# Patient Record
Sex: MALE | Race: BLACK OR AFRICAN AMERICAN | NOT HISPANIC OR LATINO | Employment: PART TIME | URBAN - METROPOLITAN AREA
[De-identification: names, ages, dates, MRNs, and addresses within clinical notes are randomized per-mention and may not be internally consistent; named-entity substitution may affect disease eponyms.]

---

## 2024-07-30 ENCOUNTER — OFFICE VISIT (OUTPATIENT)
Dept: FAMILY MEDICINE | Facility: CLINIC | Age: 59
End: 2024-07-30
Payer: COMMERCIAL

## 2024-07-30 VITALS
HEIGHT: 64 IN | HEART RATE: 67 BPM | RESPIRATION RATE: 12 BRPM | SYSTOLIC BLOOD PRESSURE: 127 MMHG | DIASTOLIC BLOOD PRESSURE: 86 MMHG | WEIGHT: 211 LBS | BODY MASS INDEX: 36.02 KG/M2 | TEMPERATURE: 97.8 F | OXYGEN SATURATION: 98 %

## 2024-07-30 DIAGNOSIS — E66.812 CLASS 2 SEVERE OBESITY DUE TO EXCESS CALORIES WITH SERIOUS COMORBIDITY AND BODY MASS INDEX (BMI) OF 36.0 TO 36.9 IN ADULT (H): ICD-10-CM

## 2024-07-30 DIAGNOSIS — M25.511 CHRONIC RIGHT SHOULDER PAIN: ICD-10-CM

## 2024-07-30 DIAGNOSIS — G89.29 CHRONIC RIGHT SHOULDER PAIN: ICD-10-CM

## 2024-07-30 DIAGNOSIS — G25.0 ESSENTIAL TREMOR: Primary | ICD-10-CM

## 2024-07-30 DIAGNOSIS — E78.00 HIGH CHOLESTEROL: ICD-10-CM

## 2024-07-30 DIAGNOSIS — H53.9 VISION CHANGES: ICD-10-CM

## 2024-07-30 DIAGNOSIS — E66.01 CLASS 2 SEVERE OBESITY DUE TO EXCESS CALORIES WITH SERIOUS COMORBIDITY AND BODY MASS INDEX (BMI) OF 36.0 TO 36.9 IN ADULT (H): ICD-10-CM

## 2024-07-30 PROCEDURE — 99204 OFFICE O/P NEW MOD 45 MIN: CPT | Performed by: PHYSICIAN ASSISTANT

## 2024-07-30 PROCEDURE — G2211 COMPLEX E/M VISIT ADD ON: HCPCS | Performed by: PHYSICIAN ASSISTANT

## 2024-07-30 RX ORDER — ATORVASTATIN CALCIUM 20 MG/1
20 TABLET, FILM COATED ORAL DAILY
Qty: 90 TABLET | Refills: 3 | Status: SHIPPED | OUTPATIENT
Start: 2024-07-30

## 2024-07-30 RX ORDER — PROPRANOLOL HYDROCHLORIDE 10 MG/1
10 TABLET ORAL 2 TIMES DAILY
Qty: 180 TABLET | Refills: 3 | Status: SHIPPED | OUTPATIENT
Start: 2024-07-30

## 2024-07-30 ASSESSMENT — PAIN SCALES - GENERAL: PAINLEVEL: SEVERE PAIN (7)

## 2024-07-30 NOTE — PROGRESS NOTES
Assessment & Plan     Class 2 severe obesity due to excess calories with serious comorbidity and body mass index (BMI) of 36.0 to 36.9 in adult (H)  Given handout    Essential tremor  stable  - propranolol (INDERAL) 10 MG tablet; Take 1 tablet (10 mg) by mouth 2 times daily    Vision changes  referred  - Adult Eye  Referral; Future  - Basic metabolic panel  (Ca, Cl, CO2, Creat, Gluc, K, Na, BUN); Future    High cholesterol  Restart meds then check labs in the fall  - atorvastatin (LIPITOR) 20 MG tablet; Take 1 tablet (20 mg) by mouth daily For cholesterol  - Lipid panel reflex to direct LDL Fasting; Future    Chronic right shoulder pain  If pt not helpful refer to sports medicine  Continue tylenol prn  - Physical Therapy  Referral; Future      Patient Instructions   Lifestyle recommendations:  Being overweight or obese puts you are risk of major health problems including but not limited to: heart disease/heart attack, stroke, high cholesterol, high blood pressure, and diabetes.  This is why it is important to be at a healthy weight for your height.     Exercise 30 minutes 5 times a week, if you can only do 10 minutes 3 times a week that is still shown to have great benefit!  Brisk walking even counts for this.  Consider free youtube videos for exercise that fits your needs and lifestyle.     Monitor your caffeine and soda intake, try to minimize these beverages    Drink plenty of water (about 70-80 ounces a day)    Try to eat a vegetable and fruit  with lunch and dinner.  Have a breakfast that contains protein such as eggs or oatmeal.  Decrease your white bread, pasta, and sweets intake.  Increase lean proteins like chicken or pork. Try to eat out 1-2 times a week or less.  Monitor your portion sizes, try using smaller plates if needed.  Eat slowly, this gives you time to be aware that your body is full.     Let me know at any time if you would like a referral to a  "nutritionist!          Subjective   Holden is a 58 year old, presenting for the following health issues:  Shoulder Pain (right), Tremors, and Referral (Eye provider for Rx glasses)        7/30/2024    12:41 PM   Additional Questions   Roomed by Bella STAFFORD CMA   Accompanied by alone         7/30/2024    12:41 PM   Patient Reported Additional Medications   Patient reports taking the following new medications None     Shoulder Pain    History of Present Illness       Reason for visit:  Rotator cuff tendonitis, essential tremors and eye examination  Symptom onset:  More than a month  Symptoms include:  Pain in the arm and shoulder  Symptom intensity:  Moderate  Symptom progression:  Improving  Had these symptoms before:  No  What makes it worse:  Lifting heavy stuff, and lying on the arm  What makes it better:  Physio exercises    He eats 0-1 servings of fruits and vegetables daily.He consumes 0 sweetened beverage(s) daily.He exercises with enough effort to increase his heart rate 10 to 19 minutes per day.  He exercises with enough effort to increase his heart rate 3 or less days per week.   He is taking medications regularly.       Coming from [place]. Used to take a medication for essential tremor but ran out.     Essential tremor-was on propanolol 10 mg bid. Was on for 5 months. Helped. No side effects.     High cholesterol-last labs in october. Not due. No side effects. Has been out of atorvastatin for a month.     Shoulder pain comes and goes. Was on naproxen 500 mg bid prn. Has ran out. Told tendonitis before. Tylenol actually works well. Started physical therapy for his shoulder. Did not finish. He would like to.     Has not had colonoscopy before. Thinks he did a stool test but not sure which kind. He wants to look into this first before pursuing other testing.           Objective    /86   Pulse 67   Temp 97.8  F (36.6  C) (Temporal)   Resp 12   Ht 1.626 m (5' 4\")   Wt 95.7 kg (211 lb)   SpO2 98%   " BMI 36.22 kg/m    Body mass index is 36.22 kg/m .  Physical Exam   GENERAL: alert and no distress  RESP: lungs clear to auscultation - no rales, rhonchi or wheezes  CV: regular rate and rhythm, normal S1 S2, no S3 or S4, no murmur, click or rub, no peripheral edema  MS: no gross musculoskeletal defects noted, no edema  PSYCH: mentation appears normal, affect normal/bright            Signed Electronically by: Shirley Shabazz PA-C

## 2024-07-30 NOTE — PATIENT INSTRUCTIONS
Lifestyle recommendations:  Being overweight or obese puts you are risk of major health problems including but not limited to: heart disease/heart attack, stroke, high cholesterol, high blood pressure, and diabetes.  This is why it is important to be at a healthy weight for your height.     Exercise 30 minutes 5 times a week, if you can only do 10 minutes 3 times a week that is still shown to have great benefit!  Brisk walking even counts for this.  Consider free youtube videos for exercise that fits your needs and lifestyle.     Monitor your caffeine and soda intake, try to minimize these beverages    Drink plenty of water (about 70-80 ounces a day)    Try to eat a vegetable and fruit  with lunch and dinner.  Have a breakfast that contains protein such as eggs or oatmeal.  Decrease your white bread, pasta, and sweets intake.  Increase lean proteins like chicken or pork. Try to eat out 1-2 times a week or less.  Monitor your portion sizes, try using smaller plates if needed.  Eat slowly, this gives you time to be aware that your body is full.     Let me know at any time if you would like a referral to a nutritionist!

## 2024-07-31 ENCOUNTER — APPOINTMENT (OUTPATIENT)
Dept: OPTOMETRY | Facility: CLINIC | Age: 59
End: 2024-07-31
Payer: COMMERCIAL

## 2024-07-31 ENCOUNTER — OFFICE VISIT (OUTPATIENT)
Dept: OPHTHALMOLOGY | Facility: CLINIC | Age: 59
End: 2024-07-31
Attending: PHYSICIAN ASSISTANT
Payer: COMMERCIAL

## 2024-07-31 DIAGNOSIS — H52.4 HYPEROPIA OF BOTH EYES WITH ASTIGMATISM AND PRESBYOPIA: ICD-10-CM

## 2024-07-31 DIAGNOSIS — H52.203 HYPEROPIA OF BOTH EYES WITH ASTIGMATISM AND PRESBYOPIA: ICD-10-CM

## 2024-07-31 DIAGNOSIS — H52.03 HYPEROPIA OF BOTH EYES WITH ASTIGMATISM AND PRESBYOPIA: ICD-10-CM

## 2024-07-31 DIAGNOSIS — H25.813 COMBINED FORMS OF AGE-RELATED CATARACT OF BOTH EYES: Primary | ICD-10-CM

## 2024-07-31 PROCEDURE — V2200 LENS SPHER BIFOC PLANO 4.00D: HCPCS | Mod: LT | Performed by: STUDENT IN AN ORGANIZED HEALTH CARE EDUCATION/TRAINING PROGRAM

## 2024-07-31 PROCEDURE — V2020 VISION SVCS FRAMES PURCHASES: HCPCS | Performed by: STUDENT IN AN ORGANIZED HEALTH CARE EDUCATION/TRAINING PROGRAM

## 2024-07-31 PROCEDURE — 92015 DETERMINE REFRACTIVE STATE: CPT | Performed by: STUDENT IN AN ORGANIZED HEALTH CARE EDUCATION/TRAINING PROGRAM

## 2024-07-31 PROCEDURE — 92004 COMPRE OPH EXAM NEW PT 1/>: CPT | Performed by: STUDENT IN AN ORGANIZED HEALTH CARE EDUCATION/TRAINING PROGRAM

## 2024-07-31 ASSESSMENT — REFRACTION_MANIFEST
OD_AXIS: 170
OD_CYLINDER: +1.00
OD_SPHERE: +2.00
OS_CYLINDER: SPHERE
OS_SPHERE: +2.50
OS_ADD: +2.50
OD_ADD: +2.50

## 2024-07-31 ASSESSMENT — VISUAL ACUITY
METHOD: SNELLEN - LINEAR
OD_CC: 20/60
OS_CC: 20/30
OS_CC: J1
CORRECTION_TYPE: GLASSES
OD_CC+: -2
OD_CC: J3

## 2024-07-31 ASSESSMENT — CONF VISUAL FIELD
OS_NORMAL: 1
OS_INFERIOR_TEMPORAL_RESTRICTION: 0
OD_SUPERIOR_NASAL_RESTRICTION: 0
OS_SUPERIOR_TEMPORAL_RESTRICTION: 0
OD_INFERIOR_TEMPORAL_RESTRICTION: 0
OS_INFERIOR_NASAL_RESTRICTION: 0
OD_NORMAL: 1
OD_INFERIOR_NASAL_RESTRICTION: 0
OD_SUPERIOR_TEMPORAL_RESTRICTION: 0
OS_SUPERIOR_NASAL_RESTRICTION: 0

## 2024-07-31 ASSESSMENT — REFRACTION_WEARINGRX
OD_CYLINDER: SPHERE
OD_SPHERE: +1.25
OS_SPHERE: +1.25
SPECS_TYPE: BIFOCAL
OS_CYLINDER: SPHERE
OD_ADD: +3.00
OS_ADD: +3.00

## 2024-07-31 ASSESSMENT — CUP TO DISC RATIO
OS_RATIO: 0.2
OD_RATIO: 0.15

## 2024-07-31 ASSESSMENT — EXTERNAL EXAM - RIGHT EYE: OD_EXAM: NORMAL

## 2024-07-31 ASSESSMENT — TONOMETRY
IOP_METHOD: APPLANATION
OD_IOP_MMHG: 22
OS_IOP_MMHG: 25

## 2024-07-31 ASSESSMENT — EXTERNAL EXAM - LEFT EYE: OS_EXAM: NORMAL

## 2024-07-31 ASSESSMENT — SLIT LAMP EXAM - LIDS
COMMENTS: 1+ MEIBOMIAN GLAND DYSFUNCTION
COMMENTS: 1+ MEIBOMIAN GLAND DYSFUNCTION

## 2024-07-31 NOTE — LETTER
7/31/2024      Holden Ly  [address] [address]      Dear Colleague,    Thank you for referring your patient, Holden Ly, to the [facility]. Please see a copy of my visit note below.     Current Eye Medications:  none.       Subjective:  Comprehensive Eye Exam.  The patient complains of difficulty seeing to drive at night.  He is bothered by glare.  Vision during the day is adequate, distance and near.  When reading or looking at the computer screen, he has to frequently adjust the position of his glasses.      Last eye exam:  ~3 years ago.    No history of eye injuries or surgery.  No family history of glaucoma.       Objective:  See Ophthalmology Exam.       Assessment:  Holden Ly is a 58 year old male who presents with:   Encounter Diagnoses   Name Primary?     Combined forms of age-related cataract of both eyes Mild      Hyperopia of both eyes with astigmatism and presbyopia         Plan:  Glasses prescription given     Lamar Caballero MD  [phone]     Again, thank you for allowing me to participate in the care of your patient.        Sincerely,        Lamar Caballero MD

## 2024-07-31 NOTE — PROGRESS NOTES
Current Eye Medications:  none.       Subjective:  Comprehensive Eye Exam.  The patient complains of difficulty seeing to drive at night.  He is bothered by glare.  Vision during the day is adequate, distance and near.  When reading or looking at the computer screen, he has to frequently adjust the position of his glasses.      Last eye exam:  ~3 years ago.    No history of eye injuries or surgery.  No family history of glaucoma.       Objective:  See Ophthalmology Exam.       Assessment:  Holden Ly is a 58 year old male who presents with:   Encounter Diagnoses   Name Primary?    Combined forms of age-related cataract of both eyes Mild     Hyperopia of both eyes with astigmatism and presbyopia         Plan:  Glasses prescription given     Lamar Caballero MD  [phone]

## 2024-08-01 ENCOUNTER — THERAPY VISIT (OUTPATIENT)
Dept: PHYSICAL THERAPY | Facility: CLINIC | Age: 59
End: 2024-08-01
Attending: PHYSICIAN ASSISTANT
Payer: COMMERCIAL

## 2024-08-01 DIAGNOSIS — M25.511 CHRONIC RIGHT SHOULDER PAIN: ICD-10-CM

## 2024-08-01 DIAGNOSIS — G89.29 CHRONIC RIGHT SHOULDER PAIN: ICD-10-CM

## 2024-08-01 PROCEDURE — 97161 PT EVAL LOW COMPLEX 20 MIN: CPT | Mod: GP

## 2024-08-01 PROCEDURE — 97110 THERAPEUTIC EXERCISES: CPT | Mod: GP

## 2024-08-01 ASSESSMENT — ACTIVITIES OF DAILY LIVING (ADL)
PUTTING_ON_YOUR_PANTS: 0
PLACING_AN_OBJECT_ON_A_HIGH_SHELF: 8
PUTTING_ON_A_SHIRT_THAT_BUTTONS_DOWN_THE_FRONT: 0
TOUCHING_THE_BACK_OF_YOUR_NECK: 8
REACHING_FOR_SOMETHING_ON_A_HIGH_SHELF: 9
WHEN_LYING_ON_THE_INVOLVED_SIDE: 8
CARRYING_A_HEAVY_OBJECT_OF_10_POUNDS: 8
WASHING_YOUR_HAIR?: 2
AT_ITS_WORST?: 7
REMOVING_SOMETHING_FROM_YOUR_BACK_POCKET: 0
PUTTING_ON_AN_UNDERSHIRT_OR_A_PULLOVER_SWEATER: 3
WASHING_YOUR_BACK: 5
PLEASE_INDICATE_YOR_PRIMARY_REASON_FOR_REFERRAL_TO_THERAPY:: SHOULDER
PUSHING_WITH_THE_INVOLVED_ARM: 8

## 2024-08-01 NOTE — PROGRESS NOTES
PHYSICAL THERAPY EVALUATION  Type of Visit: Evaluation       Fall Risk Screen:  Fall screen completed by: PT  Have you fallen 2 or more times in the past year?: No  Have you fallen and had an injury in the past year?: No  Is patient a fall risk?: No    Subjective       Presenting condition or subjective complaint: rotator cuff pain on right shoulder  Date of onset: 07/30/24 (date of referral)    Relevant medical history:     Dates & types of surgery: No surgery ever    59 y/o male presents to physical therapy with chief complaint of right shoulder pain. Started about 3 months ago now, he was going back and forth between [facility] and [place] and he started to notice symptoms. 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 Had 2 PT sessions in [place] prior to moving full time to [place]. Exercises he was given helped with pain, but they have recently started again. He points to the lateral shoulder and radiates down the arm to the elbow and into the neck. Denies radicular symptoms.    Prior diagnostic imaging/testing results: Other no test done since   Prior therapy history for the same diagnosis, illness or injury: Yes May 26 and 30,2024. Physical therapy in [place]    Prior Level of Function  Transfers:   Ambulation:   ADL:   IADL:     Living Environment  Social support: Alone   Type of home: House   Stairs to enter the home: No       Ramp: No   Stairs inside the home: Yes 6 Is there a railing: Yes     Help at home: None  Equipment owned:       Employment: Yes Bookkeeping  Hobbies/Interests: Reading,listening to news and watching sports    Patient goals for therapy: movement of arms and lifting or pushing objects without pain    Pain assessment:      Objective   SHOULDER EVALUATION  PAIN: Pain Level at Rest: 7/10  Pain Level with Use: 7/10  Pain Location: cervical spine and shoulder  Pain Quality: Sharp  Pain Frequency: intermittent  Pain is Exacerbated By: laying on right side, lifting on R side,  driving, putting things in overhead compartments, reaching behind back, using arm rest and leaning onto right side   Pain is Relieved By: exercises from previous PT, avoiding use, Voltarin  Pain Progression: worse over last 2 weeks  INTEGUMENTARY (edema, incisions):   POSTURE: Sitting Posture: Rounded shoulders, Forward head, increased scap winging on R side   GAIT:   Weightbearing Status:   Assistive Device(s):   Gait Deviations: WNL  BALANCE/PROPRIOCEPTION:   WEIGHTBEARING ALIGNMENT:   ROM:   (Degrees) Left AROM Left PROM Right AROM  Right PROM   Shoulder Flexion   170 pain at end range    Shoulder Extension   WNL    Shoulder Abduction   150 deg - equal to contralateral, pain at end range    Shoulder Adduction       Shoulder Internal Rotation Mid scapulas  Mod restriction - mid lumbar spine    Shoulder External Rotation   WNL    Shoulder Horizontal Abduction       Shoulder Horizontal Adduction       Shoulder Flexion ER       Shoulder Flexion IR       Elbow Extension   WNL    Elbow Flexion   WNL    Pain:   End feel:     STRENGTH:  All shoulder MMTs 5/5 yunior except for R flexion (4+/5), R abduction (4/5), and R IR (4+/5)  FLEXIBILITY:   SPECIAL TESTS:  IR lift off and throwers (+); belly press, empty can (-)  PALPATION:  TTP at anterior shoulder near biceps tendon, biceps, pec major, R UT, lateral R shoulder at RTC insertion  JOINT MOBILITY: WNL  CERVICAL SCREEN:  All motions WNL, discomfort with R SB on R side     Assessment & Plan   CLINICAL IMPRESSIONS  Medical Diagnosis: Chronic right shoulder pain    Treatment Diagnosis: Chronic right shoulder pain   Impression/Assessment: Patient is a 58 year old male with right shoulder complaints.  The following significant findings have been identified: Pain, Decreased ROM/flexibility, Decreased strength, Impaired muscle performance, Decreased activity tolerance, and Impaired posture. These impairments interfere with their ability to perform work tasks, recreational  activities, household chores, and driving  as compared to previous level of function.     Clinical Decision Making (Complexity):  Clinical Presentation: Stable/Uncomplicated  Clinical Presentation Rationale: based on medical and personal factors listed in PT evaluation  Clinical Decision Making (Complexity): Low complexity    PLAN OF CARE  Treatment Interventions:  Modalities: Cryotherapy, E-stim, Hot Pack  Interventions: Manual Therapy, Neuromuscular Re-education, Therapeutic Activity, Therapeutic Exercise    Long Term Goals     PT Goal 1  Goal Identifier: LTG 1  Goal Description: Pt to be able to lift 5# object into overhead cabinet with <2/10 pain in R shoulder  Rationale: to maximize safety and independence with performance of ADLs and functional tasks;to maximize safety and independence within the home;to maximize safety and independence within the community  Target Date: 09/26/24      Frequency of Treatment: 1x per week  Duration of Treatment: 8 weeks    Recommended Referrals to Other Professionals:   Education Assessment:   Learner/Method: Patient;No Barriers to Learning  Education Comments: Educated on goals of PT, expected response to HEP    Risks and benefits of evaluation/treatment have been explained.   Patient/Family/caregiver agrees with Plan of Care.     Evaluation Time:     PT Eval, Low Complexity Minutes (43582): 25       Signing Clinician: Kodak Chaparro, PT        [facility]                                                                                   OUTPATIENT PHYSICAL THERAPY      PLAN OF TREATMENT FOR OUTPATIENT REHABILITATION   Patient's Last Name, First Name, Holden Alarcon YOB: 1965   Provider's Name   [facility]   Medical Record No.  [id number]     Onset Date: 07/30/24 (date of referral)  Start of Care Date: 08/01/24     Medical Diagnosis:  Chronic right shoulder pain      PT Treatment Diagnosis:   Chronic right shoulder pain Plan of Treatment  Frequency/Duration: 1x per week/ 8 weeks    Certification date from 08/01/24 to 09/26/24         See note for plan of treatment details and functional goals     Kodak Chaparro, PT                         I CERTIFY THE NEED FOR THESE SERVICES FURNISHED UNDER        THIS PLAN OF TREATMENT AND WHILE UNDER MY CARE     (Physician attestation of this document indicates review and certification of the therapy plan).              Referring Provider:  Shirley Shabazz    Initial Assessment  See Epic Evaluation- Start of Care Date: 08/01/24

## 2024-08-13 ENCOUNTER — APPOINTMENT (OUTPATIENT)
Dept: OPTOMETRY | Facility: CLINIC | Age: 59
End: 2024-08-13
Payer: COMMERCIAL

## 2024-08-13 PROCEDURE — 92341 FIT SPECTACLES BIFOCAL: CPT | Performed by: STUDENT IN AN ORGANIZED HEALTH CARE EDUCATION/TRAINING PROGRAM

## 2024-09-19 ENCOUNTER — THERAPY VISIT (OUTPATIENT)
Dept: PHYSICAL THERAPY | Facility: CLINIC | Age: 59
End: 2024-09-19
Payer: COMMERCIAL

## 2024-09-19 DIAGNOSIS — M25.511 CHRONIC RIGHT SHOULDER PAIN: Primary | ICD-10-CM

## 2024-09-19 DIAGNOSIS — G89.29 CHRONIC RIGHT SHOULDER PAIN: Primary | ICD-10-CM

## 2024-09-19 PROCEDURE — 97110 THERAPEUTIC EXERCISES: CPT | Mod: GP

## 2024-09-25 ENCOUNTER — MYC REFILL (OUTPATIENT)
Dept: FAMILY MEDICINE | Facility: CLINIC | Age: 59
End: 2024-09-25
Payer: COMMERCIAL

## 2024-09-25 DIAGNOSIS — G25.0 ESSENTIAL TREMOR: ICD-10-CM

## 2024-09-25 DIAGNOSIS — E78.00 HIGH CHOLESTEROL: ICD-10-CM

## 2024-09-25 RX ORDER — PROPRANOLOL HYDROCHLORIDE 10 MG/1
10 TABLET ORAL 2 TIMES DAILY
Qty: 180 TABLET | Refills: 3 | OUTPATIENT
Start: 2024-09-25

## 2024-09-25 RX ORDER — ATORVASTATIN CALCIUM 20 MG/1
20 TABLET, FILM COATED ORAL DAILY
Qty: 90 TABLET | Refills: 3 | OUTPATIENT
Start: 2024-09-25

## 2024-10-06 ENCOUNTER — HEALTH MAINTENANCE LETTER (OUTPATIENT)
Age: 59
End: 2024-10-06

## 2024-10-09 SDOH — HEALTH STABILITY: PHYSICAL HEALTH: ON AVERAGE, HOW MANY MINUTES DO YOU ENGAGE IN EXERCISE AT THIS LEVEL?: 30 MIN

## 2024-10-09 SDOH — HEALTH STABILITY: PHYSICAL HEALTH: ON AVERAGE, HOW MANY DAYS PER WEEK DO YOU ENGAGE IN MODERATE TO STRENUOUS EXERCISE (LIKE A BRISK WALK)?: 3 DAYS

## 2024-10-09 ASSESSMENT — SOCIAL DETERMINANTS OF HEALTH (SDOH): HOW OFTEN DO YOU GET TOGETHER WITH FRIENDS OR RELATIVES?: ONCE A WEEK

## 2024-10-10 ENCOUNTER — OFFICE VISIT (OUTPATIENT)
Dept: FAMILY MEDICINE | Facility: CLINIC | Age: 59
End: 2024-10-10
Payer: COMMERCIAL

## 2024-10-10 ENCOUNTER — ORDERS ONLY (AUTO-RELEASED) (OUTPATIENT)
Dept: FAMILY MEDICINE | Facility: CLINIC | Age: 59
End: 2024-10-10

## 2024-10-10 VITALS
RESPIRATION RATE: 16 BRPM | TEMPERATURE: 96.9 F | SYSTOLIC BLOOD PRESSURE: 130 MMHG | BODY MASS INDEX: 31.01 KG/M2 | HEART RATE: 67 BPM | HEIGHT: 69 IN | WEIGHT: 209.4 LBS | DIASTOLIC BLOOD PRESSURE: 85 MMHG | OXYGEN SATURATION: 100 %

## 2024-10-10 DIAGNOSIS — E78.00 HIGH CHOLESTEROL: ICD-10-CM

## 2024-10-10 DIAGNOSIS — Z12.11 SCREEN FOR COLON CANCER: ICD-10-CM

## 2024-10-10 DIAGNOSIS — Z13.1 SCREENING FOR DIABETES MELLITUS: ICD-10-CM

## 2024-10-10 DIAGNOSIS — Z12.5 SCREENING FOR MALIGNANT NEOPLASM OF PROSTATE: ICD-10-CM

## 2024-10-10 DIAGNOSIS — L60.3 NAIL DYSTROPHY: ICD-10-CM

## 2024-10-10 DIAGNOSIS — G25.0 ESSENTIAL TREMOR: ICD-10-CM

## 2024-10-10 DIAGNOSIS — Z00.00 ROUTINE GENERAL MEDICAL EXAMINATION AT A HEALTH CARE FACILITY: Primary | ICD-10-CM

## 2024-10-10 DIAGNOSIS — M79.674 PAIN OF TOE OF RIGHT FOOT: ICD-10-CM

## 2024-10-10 LAB
EST. AVERAGE GLUCOSE BLD GHB EST-MCNC: 128 MG/DL
HBA1C MFR BLD: 6.1 % (ref 0–5.6)

## 2024-10-10 PROCEDURE — G0103 PSA SCREENING: HCPCS | Performed by: PHYSICIAN ASSISTANT

## 2024-10-10 PROCEDURE — 83036 HEMOGLOBIN GLYCOSYLATED A1C: CPT | Performed by: PHYSICIAN ASSISTANT

## 2024-10-10 PROCEDURE — 99396 PREV VISIT EST AGE 40-64: CPT | Mod: 57 | Performed by: PHYSICIAN ASSISTANT

## 2024-10-10 PROCEDURE — 80053 COMPREHEN METABOLIC PANEL: CPT | Performed by: PHYSICIAN ASSISTANT

## 2024-10-10 PROCEDURE — 99214 OFFICE O/P EST MOD 30 MIN: CPT | Mod: 25 | Performed by: PHYSICIAN ASSISTANT

## 2024-10-10 PROCEDURE — 90471 IMMUNIZATION ADMIN: CPT | Performed by: PHYSICIAN ASSISTANT

## 2024-10-10 PROCEDURE — 80061 LIPID PANEL: CPT | Performed by: PHYSICIAN ASSISTANT

## 2024-10-10 PROCEDURE — 90715 TDAP VACCINE 7 YRS/> IM: CPT | Performed by: PHYSICIAN ASSISTANT

## 2024-10-10 PROCEDURE — 36415 COLL VENOUS BLD VENIPUNCTURE: CPT | Performed by: PHYSICIAN ASSISTANT

## 2024-10-10 PROCEDURE — 90472 IMMUNIZATION ADMIN EACH ADD: CPT | Performed by: PHYSICIAN ASSISTANT

## 2024-10-10 PROCEDURE — 90673 RIV3 VACCINE NO PRESERV IM: CPT | Performed by: PHYSICIAN ASSISTANT

## 2024-10-10 RX ORDER — PROPRANOLOL HYDROCHLORIDE 10 MG/1
10 TABLET ORAL 2 TIMES DAILY
Qty: 180 TABLET | Refills: 3 | Status: SHIPPED | OUTPATIENT
Start: 2024-10-10

## 2024-10-10 RX ORDER — ATORVASTATIN CALCIUM 20 MG/1
20 TABLET, FILM COATED ORAL DAILY
Qty: 90 TABLET | Refills: 3 | Status: SHIPPED | OUTPATIENT
Start: 2024-10-10

## 2024-10-10 NOTE — PROGRESS NOTES
Prior to immunization administration, verified patients identity using patient s name and date of birth. Please see Immunization Activity for additional information.     Screening Questionnaire for Adult Immunization    Are you sick today?   No   Do you have allergies to medications, food, a vaccine component or latex?   No   Have you ever had a serious reaction after receiving a vaccination?   No   Do you have a long-term health problem with heart, lung, kidney, or metabolic disease (e.g., diabetes), asthma, a blood disorder, no spleen, complement component deficiency, a cochlear implant, or a spinal fluid leak?  Are you on long-term aspirin therapy?   No   Do you have cancer, leukemia, HIV/AIDS, or any other immune system problem?   No   Do you have a parent, brother, or sister with an immune system problem?   No   In the past 3 months, have you taken medications that affect  your immune system, such as prednisone, other steroids, or anticancer drugs; drugs for the treatment of rheumatoid arthritis, Crohn s disease, or psoriasis; or have you had radiation treatments?   No   Have you had a seizure, or a brain or other nervous system problem?   No   During the past year, have you received a transfusion of blood or blood    products, or been given immune (gamma) globulin or antiviral drug?   No   For women: Are you pregnant or is there a chance you could become       pregnant during the next month?   No   Have you received any vaccinations in the past 4 weeks?   No     Immunization questionnaire answers were all negative.      Patient instructed to remain in clinic for 15 minutes afterwards, and to report any adverse reactions.     Screening performed by Bella Hooks MA on 10/10/2024 at 11:40 AM.

## 2024-10-10 NOTE — PATIENT INSTRUCTIONS
Patient Education   Preventive Care Advice   This is general advice given by our system to help you stay healthy. However, your care team may have specific advice just for you. Please talk to your care team about your preventive care needs.  Nutrition  Eat 5 or more servings of fruits and vegetables each day.  Try wheat bread, brown rice and whole grain pasta (instead of white bread, rice, and pasta).  Get enough calcium and vitamin D. Check the label on foods and aim for 100% of the RDA (recommended daily allowance).  Lifestyle  Exercise at least 150 minutes each week  (30 minutes a day, 5 days a week).  Do muscle strengthening activities 2 days a week. These help control your weight and prevent disease.  No smoking.  Wear sunscreen to prevent skin cancer.  Have a dental exam and cleaning every 6 months.  Yearly exams  See your health care team every year to talk about:  Any changes in your health.  Any medicines your care team has prescribed.  Preventive care, family planning, and ways to prevent chronic diseases.  Shots (vaccines)   HPV shots (up to age 26), if you've never had them before.  Hepatitis B shots (up to age 59), if you've never had them before.  COVID-19 shot: Get this shot when it's due.  Flu shot: Get a flu shot every year.  Tetanus shot: Get a tetanus shot every 10 years.  Pneumococcal, hepatitis A, and RSV shots: Ask your care team if you need these based on your risk.  Shingles shot (for age 50 and up)  General health tests  Diabetes screening:  Starting at age 35, Get screened for diabetes at least every 3 years.  If you are younger than age 35, ask your care team if you should be screened for diabetes.  Cholesterol test: At age 39, start having a cholesterol test every 5 years, or more often if advised.  Bone density scan (DEXA): At age 50, ask your care team if you should have this scan for osteoporosis (brittle bones).  Hepatitis C: Get tested at least once in your life.  STIs (sexually  transmitted infections)  Before age 24: Ask your care team if you should be screened for STIs.  After age 24: Get screened for STIs if you're at risk. You are at risk for STIs (including HIV) if:  You are sexually active with more than one person.  You don't use condoms every time.  You or a partner was diagnosed with a sexually transmitted infection.  If you are at risk for HIV, ask about PrEP medicine to prevent HIV.  Get tested for HIV at least once in your life, whether you are at risk for HIV or not.  Cancer screening tests  Cervical cancer screening: If you have a cervix, begin getting regular cervical cancer screening tests starting at age 21.  Breast cancer scan (mammogram): If you've ever had breasts, begin having regular mammograms starting at age 40. This is a scan to check for breast cancer.  Colon cancer screening: It is important to start screening for colon cancer at age 45.  Have a colonoscopy test every 10 years (or more often if you're at risk) Or, ask your provider about stool tests like a FIT test every year or Cologuard test every 3 years.  To learn more about your testing options, visit:   .  For help making a decision, visit:   https://bit.ly/ym94707.  Prostate cancer screening test: If you have a prostate, ask your care team if a prostate cancer screening test (PSA) at age 55 is right for you.  Lung cancer screening: If you are a current or former smoker ages 50 to 80, ask your care team if ongoing lung cancer screenings are right for you.  For informational purposes only. Not to replace the advice of your health care provider. Copyright   2023 [employer]. All rights reserved. Clinically reviewed by the [facility] Transitions Program. [employer] [id number] - REV 01/24.

## 2024-10-10 NOTE — PROGRESS NOTES
"Preventive Care Visit  [facility] WALTER Shabazz PA-C, Family Medicine  Oct 10, 2024      Assessment & Plan     Routine general medical examination at a health care facility    - TDAP 10-64Y (ADACEL,BOOSTRIX)  - INFLUENZA VACCINE TRIVALENT(FLUBLOK)    High cholesterol  I will follow up with labs     - atorvastatin (LIPITOR) 20 MG tablet; Take 1 tablet (20 mg) by mouth daily. For cholesterol  - Lipid panel reflex to direct LDL Fasting; Future  - Lipid panel reflex to direct LDL Fasting    Essential tremor  Stable, continue med  - propranolol (INDERAL) 10 MG tablet; Take 1 tablet (10 mg) by mouth 2 times daily.    Screen for colon cancer    - COLOGUARD(EXACT SCIENCES); Future    Screening for diabetes mellitus    - Comprehensive metabolic panel (BMP + Alb, Alk Phos, ALT, AST, Total. Bili, TP); Future  - Hemoglobin A1c; Future  - Comprehensive metabolic panel (BMP + Alb, Alk Phos, ALT, AST, Total. Bili, TP)  - Hemoglobin A1c    Screening for malignant neoplasm of prostate    - PSA, screen; Future  - PSA, screen    Pain of toe of right foot  Referred, unsure if ingrown with fungal infection or other  - Orthopedic  Referral; Future    Nail dystrophy  referred  - Orthopedic  Referral; Future          BMI  Estimated body mass index is 31.37 kg/m  as calculated from the following:    Height as of this encounter: 1.74 m (5' 8.5\").    Weight as of this encounter: 95 kg (209 lb 6.4 oz).   Weight management plan: Discussed healthy diet and exercise guidelines    Counseling  Appropriate preventive services were addressed with this patient via screening, questionnaire, or discussion as appropriate for fall prevention, nutrition, physical activity, Tobacco-use cessation, social engagement, weight loss and cognition.  Checklist reviewing preventive services available has been given to the patient.  Reviewed patient's diet, addressing concerns and/or questions.   He is at risk for " lack of exercise and has been provided with information to increase physical activity for the benefit of his well-being.   The patient was instructed to see the dentist every 6 months.       Follow up if symptoms worsen or change. To ER with severe worsening symptoms.       Subjective   Holden is a 59 year old, presenting for the following:  Physical        10/10/2024    10:47 AM   Additional Questions   Roomed by Bella STAFFORD CMA   Accompanied by alone         10/10/2024    10:47 AM   Patient Reported Additional Medications   Patient reports taking the following new medications none        Health Care Directive  Patient does not have a Health Care Directive or Living Will:     HPI        Moved from [place].      Chronic conditions:  Essential tremor-was on propanolol 10 mg bid.  Helps. No side effects.      High cholesterol-last labs in october. Due. No side effects. On  atorvastatin for a month.      Shoulder pain comes and goes. Was on naproxen 500 mg bid prn. Has ran out. Told tendonitis before. Tylenol actually works well.   Did PT and his shoulder is feeling better. To ortho in future if needed.      Has not had colonoscopy before. Thinks he did a stool test but not sure which kind. He wants to look into this first before pursuing other testing.      R toenail pain second toenail thick and black now and it recently started hurting some.       10/9/2024   General Health   How would you rate your overall physical health? Good   Feel stress (tense, anxious, or unable to sleep) Not at all            10/9/2024   Nutrition   Three or more servings of calcium each day? Yes   Diet: Regular (no restrictions)   How many servings of fruit and vegetables per day? (!) 0-1   How many sweetened beverages each day? 0-1            10/9/2024   Exercise   Days per week of moderate/strenous exercise 3 days   Average minutes spent exercising at this level 30 min            10/9/2024   Social Factors   Frequency of gathering with  "friends or relatives Once a week   Worry food won't last until get money to buy more Yes   Food not last or not have enough money for food? No   Do you have housing? (Housing is defined as stable permanent housing and does not include staying ouside in a car, in a tent, in an abandoned building, in an overnight shelter, or couch-surfing.) Yes   Are you worried about losing your housing? No   Lack of transportation? No   Unable to get utilities (heat,electricity)? No      (!) FOOD SECURITY CONCERN PRESENT      10/9/2024   Fall Risk   Fallen 2 or more times in the past year? No   Trouble with walking or balance? No             10/9/2024   Dental   Dentist two times every year? (!) NO            10/9/2024   TB Screening   Were you born outside of the [place]? Yes              10/9/2024   Substance Use   Alcohol more than 3/day or more than 7/wk Not Applicable   Do you use any other substances recreationally? No        Social History     Tobacco Use    Smoking status: Never     Passive exposure: Yes    Smokeless tobacco: Never    Tobacco comments:     Back then it was occasionally smoking not habitual   Vaping Use    Vaping status: Never Used   Substance Use Topics    Alcohol use: Not Currently    Drug use: Never             10/9/2024   One time HIV Screening   Previous HIV test? Yes          10/9/2024   STI Screening   New sexual partner(s) since last STI/HIV test? No      Last PSA: No results found for: \"PSA\"  ASCVD Risk   The ASCVD Risk score (Marcella AGUILLON, et al., 2019) failed to calculate for the following reasons:    Cannot find a previous HDL lab    Cannot find a previous total cholesterol lab         Reviewed and updated as needed this visit by Provider                    No past medical history on file.  Past Surgical History:   Procedure Laterality Date    NO HISTORY OF SURGERY            Objective    Exam  /85   Pulse 67   Temp 96.9  F (36.1  C) (Temporal)   Resp 16   Ht 1.74 m (5' 8.5\")   Wt 95 " "kg (209 lb 6.4 oz)   SpO2 100%   BMI 31.37 kg/m     Estimated body mass index is 31.37 kg/m  as calculated from the following:    Height as of this encounter: 1.74 m (5' 8.5\").    Weight as of this encounter: 95 kg (209 lb 6.4 oz).    Physical Exam  GENERAL: alert and no distress  EYES: Eyes grossly normal to inspection, PERRL and conjunctivae and sclerae normal  HENT: ear canals and TM's normal, nose and mouth without ulcers or lesions  NECK: no adenopathy, no asymmetry, masses, or scars  RESP: lungs clear to auscultation - no rales, rhonchi or wheezes  CV: regular rate and rhythm, normal S1 S2, no S3 or S4, no murmur, click or rub, no peripheral edema  MS: no gross musculoskeletal defects noted, no edema  SKIN: no suspicious lesions or rashes. R second toenail-distal end is very thick and black. ? fungal  NEURO: Normal strength and tone, mentation intact and speech normal  PSYCH: mentation appears normal, affect normal/bright        Signed Electronically by: Shirley Shabazz PA-C    "

## 2024-10-10 NOTE — LETTER
October 25, 2024      Holden Ly  [address] [address]        Dear ,    We are writing to inform you of your test results.    Your recent test results are attached, if you have any questions or concerns please feel free to contact me via my chart or call [phone]. Kidney function is somewhat decreased, this could be if you were not well hydrated for the test. I would recheck this yearly. Liver function and electrolytes normal. Cholesterol a bit higher than I would like, lets recheck that in a year and if no change I would adjust your cholesterol medication. A1C was 6.1 which is consistent with the diagnosis of pre-diabetes. This means your blood sugars have been above the normal range but not yet in the diabetic range. This means you are at increased risk of developing diabetes over time.  Lifestyle changes can help with this. Increasing your activity, even walking more every day can help.  Decreasing your sugar and carbohydrate intake can also help.  For example consuming less white bread, pasta, chips/pretzels, soda, juice and eating more vegetables and lean protein. When you do have carbohydrates choosing 100 percent whole wheat is better than white bread for example. We should recheck your A1C in 12 months. Prostate cancer screening test normal.     It was a pleasure to see you at your recent office visit.     Resulted Orders   Comprehensive metabolic panel (BMP + Alb, Alk Phos, ALT, AST, Total. Bili, TP)   Result Value Ref Range    Sodium 139 135 - 145 mmol/L    Potassium 4.5 3.4 - 5.3 mmol/L    Carbon Dioxide (CO2) 24 22 - 29 mmol/L    Anion Gap 7 7 - 15 mmol/L    Urea Nitrogen 16.6 8.0 - 23.0 mg/dL    Creatinine 1.37 (H) 0.67 - 1.17 mg/dL    GFR Estimate 59 (L) >60 mL/min/1.73m2      Comment:      eGFR calculated using 2021 CKD-EPI equation.    Calcium 9.5 8.8 - 10.4 mg/dL      Comment:      Reference intervals for this test were updated on 7/16/2024 to reflect our  healthy population more accurately. There may be differences in the flagging of prior results with similar values performed with this method. Those prior results can be interpreted in the context of the updated reference intervals.    Chloride 108 (H) 98 - 107 mmol/L    Glucose 105 (H) 70 - 99 mg/dL    Alkaline Phosphatase 49 40 - 150 U/L    AST 28 0 - 45 U/L    ALT 48 0 - 70 U/L    Protein Total 7.5 6.4 - 8.3 g/dL    Albumin 4.3 3.5 - 5.2 g/dL    Bilirubin Total 0.3 <=1.2 mg/dL    Patient Fasting > 8hrs? Yes    Hemoglobin A1c   Result Value Ref Range    Estimated Average Glucose 128 (H) <117 mg/dL    Hemoglobin A1C 6.1 (H) 0.0 - 5.6 %      Comment:      Normal <5.7%   Prediabetes 5.7-6.4%    Diabetes 6.5% or higher     Note: Adopted from ADA consensus guidelines.       If you have any questions or concerns, please call the clinic at the number listed above.       Sincerely,      Shilrey Shabazz PA-C

## 2024-10-11 PROBLEM — R73.03 PREDIABETES: Status: ACTIVE | Noted: 2024-10-11

## 2024-10-11 LAB
ALBUMIN SERPL BCG-MCNC: 4.3 G/DL (ref 3.5–5.2)
ALP SERPL-CCNC: 49 U/L (ref 40–150)
ALT SERPL W P-5'-P-CCNC: 48 U/L (ref 0–70)
ANION GAP SERPL CALCULATED.3IONS-SCNC: 7 MMOL/L (ref 7–15)
AST SERPL W P-5'-P-CCNC: 28 U/L (ref 0–45)
BILIRUB SERPL-MCNC: 0.3 MG/DL
BUN SERPL-MCNC: 16.6 MG/DL (ref 8–23)
CALCIUM SERPL-MCNC: 9.5 MG/DL (ref 8.8–10.4)
CHLORIDE SERPL-SCNC: 108 MMOL/L (ref 98–107)
CHOLEST SERPL-MCNC: 188 MG/DL
CREAT SERPL-MCNC: 1.37 MG/DL (ref 0.67–1.17)
EGFRCR SERPLBLD CKD-EPI 2021: 59 ML/MIN/1.73M2
FASTING STATUS PATIENT QL REPORTED: YES
FASTING STATUS PATIENT QL REPORTED: YES
GLUCOSE SERPL-MCNC: 105 MG/DL (ref 70–99)
HCO3 SERPL-SCNC: 24 MMOL/L (ref 22–29)
HDLC SERPL-MCNC: 29 MG/DL
LDLC SERPL CALC-MCNC: 151 MG/DL
NONHDLC SERPL-MCNC: 159 MG/DL
POTASSIUM SERPL-SCNC: 4.5 MMOL/L (ref 3.4–5.3)
PROT SERPL-MCNC: 7.5 G/DL (ref 6.4–8.3)
PSA SERPL DL<=0.01 NG/ML-MCNC: 0.41 NG/ML (ref 0–4.5)
SODIUM SERPL-SCNC: 139 MMOL/L (ref 135–145)
TRIGL SERPL-MCNC: 41 MG/DL

## 2024-10-11 NOTE — RESULT ENCOUNTER NOTE
Andrew Hatch,       Your recent test results are attached, if you have any questions or concerns please feel free to contact me via e-mail or call [phone].  Kidney function is somewhat decreased, this could be if you were not well hydrated for the test. I would recheck this yearly. Liver function and electrolytes normal. Cholesterol a bit higher than I would like, lets recheck that in a year and if no change I would adjust your cholesterol medication. A1C was 6.1 which is consistent with the diagnosis of pre-diabetes. This means your blood sugars have been above the normal range but not yet in the diabetic range. This means you are at increased risk of developing diabetes over time.  Lifestyle changes can help with this. Increasing your activity, even walking more every day can help.  Decreasing your sugar and carbohydrate intake can also help.  For example consuming less white bread, pasta, chips/pretzels, soda, juice and eating more vegetables and lean protein. When you do have carbohydrates choosing 100 percent whole wheat is better than white bread for example.  We should recheck your A1C in 12 months.  Prostate cancer screening test normal.        It was a pleasure to see you at your recent office visit.      Sincerely,  Shirley Shabazz PA-C

## 2024-10-24 LAB — NONINV COLON CA DNA+OCC BLD SCRN STL QL: NEGATIVE

## 2024-10-25 NOTE — RESULT ENCOUNTER NOTE
PLEASE SEND LETTER HE DID NOT READ MYCHART thanks:     Andrew Hatch,       Your recent test results are attached, if you have any questions or concerns please feel free to contact me via e-mail or call [phone].  Kidney function is somewhat decreased, this could be if you were not well hydrated for the test. I would recheck this yearly. Liver function and electrolytes normal. Cholesterol a bit higher than I would like, lets recheck that in a year and if no change I would adjust your cholesterol medication. A1C was 6.1 which is consistent with the diagnosis of pre-diabetes. This means your blood sugars have been above the normal range but not yet in the diabetic range. This means you are at increased risk of developing diabetes over time.  Lifestyle changes can help with this. Increasing your activity, even walking more every day can help.  Decreasing your sugar and carbohydrate intake can also help.  For example consuming less white bread, pasta, chips/pretzels, soda, juice and eating more vegetables and lean protein. When you do have carbohydrates choosing 100 percent whole wheat is better than white bread for example.  We should recheck your A1C in 12 months.  Prostate cancer screening test normal.       It was a pleasure to see you at your recent office visit.      Sincerely,  Shirley Shabazz PA-C

## 2025-01-21 ENCOUNTER — NURSE TRIAGE (OUTPATIENT)
Dept: FAMILY MEDICINE | Facility: CLINIC | Age: 60
End: 2025-01-21
Payer: COMMERCIAL

## 2025-01-21 ENCOUNTER — OFFICE VISIT (OUTPATIENT)
Dept: ORTHOPEDICS | Facility: CLINIC | Age: 60
End: 2025-01-21
Payer: COMMERCIAL

## 2025-01-21 ENCOUNTER — ANCILLARY PROCEDURE (OUTPATIENT)
Dept: GENERAL RADIOLOGY | Facility: CLINIC | Age: 60
End: 2025-01-21
Attending: FAMILY MEDICINE
Payer: COMMERCIAL

## 2025-01-21 VITALS — BODY MASS INDEX: 30.96 KG/M2 | WEIGHT: 209 LBS | HEIGHT: 69 IN

## 2025-01-21 DIAGNOSIS — S19.9XXA INJURY OF NECK, INITIAL ENCOUNTER: Primary | ICD-10-CM

## 2025-01-21 DIAGNOSIS — M47.812 CERVICAL ARTHRITIS: ICD-10-CM

## 2025-01-21 DIAGNOSIS — S19.9XXA INJURY OF NECK, INITIAL ENCOUNTER: ICD-10-CM

## 2025-01-21 PROCEDURE — 72040 X-RAY EXAM NECK SPINE 2-3 VW: CPT | Mod: TC | Performed by: INTERNAL MEDICINE

## 2025-01-21 PROCEDURE — 99204 OFFICE O/P NEW MOD 45 MIN: CPT | Performed by: FAMILY MEDICINE

## 2025-01-21 RX ORDER — NABUMETONE 500 MG/1
500 TABLET, FILM COATED ORAL 2 TIMES DAILY PRN
Qty: 20 TABLET | Refills: 0 | Status: SHIPPED | OUTPATIENT
Start: 2025-01-21

## 2025-01-21 NOTE — PROGRESS NOTES
ASSESSMENT & PLAN    Holden was seen today for injury.    Diagnoses and all orders for this visit:    Injury of neck, initial encounter  -     XR Cervical Spine 2/3 vws; Future  -     nabumetone (RELAFEN) 500 MG tablet; Take 1 tablet (500 mg) by mouth 2 times daily as needed for moderate pain.    Cervical arthritis  -     nabumetone (RELAFEN) 500 MG tablet; Take 1 tablet (500 mg) by mouth 2 times daily as needed for moderate pain.      This issue is acute and Worsening.     # Neck Injury: Holden Ly  was seen today for neck injury. Symptoms had been going on since today 1/21/25. On examination there are positive findings of no midline cervical tenderness, right paraspinal muscle tenderness to palpation pain with cervical range of motion. Imaging findings showed mild cervical degeneration. Likely cause of patient's condition due to cervical arthritis flare in the setting of a fall. Counseled patient on nature of condition and treatment options.  Given this plan as below, follow-up 1/30/25 for repeat evaluation.     Image Findings: mild cervical arthritis  Treatment: Activities as tolerated, home exercises given today  Job: As tolerated  Medications/Injections: Relafen for pain, none today  Follow-up: 1/30/25 for repeat evaluation    Minor Bazan MD  [facility]    -----  Chief Complaint   Patient presents with    Spine - Injury       SUBJECTIVE  Holden Ly is a/an 59 year old male who is seen as a walk-in for evaluation of c-spine.     The patient is seen by themselves.    Onset: This morning, 1/21/25. Patient describes injury as getting up after utilizing the restroom, getting dizzy and fell, fainting, landing on the edge of the tub on the side of his neck. No pain initially, went back to bed, woke up with an increase in pain.  Location of Pain: C-spine, right sided neck pain into upper shoulder  Worsened by: Twisting, turning, looking up and  "down  Better with:   Treatments tried: Various topicals  Associated symptoms: no distal numbness or tingling; denies swelling or warmth    Orthopedic/Surgical history: NO  Social History/Occupation:     No family history pertinent to patient's problem today.      REVIEW OF SYSTEMS:  Review of Systems  Constitutional, HEENT, cardiovascular, pulmonary, gi and gu systems are negative, except as otherwise noted.    OBJECTIVE:  Ht 1.753 m (5' 9\")   Wt 94.8 kg (209 lb)   BMI 30.86 kg/m     General: healthy, alert and in no distress  HEENT: no scleral icterus or conjunctival erythema  Skin: no suspicious lesions or rash. No jaundice.  CV: distal perfusion intact    Resp: normal respiratory effort without conversational dyspnea   Psych: normal mood and affect  Gait: normal steady gait with appropriate coordination and balance    Neuro: Normal light sensory exam of bilateral upper extremity      Ortho Exam   CERVICAL SPINE  Inspection:    normal cervical lordosis present, rounded shoulders, forward head posture  Palpation:    TTP over right paraspinal muscles.  Range of Motion:     Flexion limited 2/2 pain    Extension limited 2/2 pain    Right side bend limited 2/2 pain    Left side bend limited 2/2 pain    Right rotation limited 2/2 pain    Left rotation limited 2/2 pain  Strength:    Full strength throughout all neck muscles  Special Tests:    Positive: None    Negative: Spurling's (bilateral)    RADIOLOGY:  I independently ordered, visualized and reviewed these images with the patient  No fracture, mild cervical degenerative changes      Review of external notes as documented elsewhere in note  Review of the result(s) of each unique test - cervical x-rays       Disclaimer: This note consists of symbols derived from keyboarding, dictation and/or voice recognition software. As a result, there may be errors in the script that have gone undetected. Please consider this when interpreting information found in this " chart.

## 2025-01-21 NOTE — TELEPHONE ENCOUNTER
Pt is calling. He was wanting to schedule appointment at [facility].   He fell last night and is now experiencing pain in neck.   States that he got up around 2 am to use bathroom and fell backwards and hit his neck on the bathtub. He did not hit his head. He landed on right side, hitting neck, shoulder and part of that side.   Denies any bruising, discoloration, or lacerations.   Pain is slight in shoulder- mild and worse in neck- rates as moderate, but when he tries to turn neck it is severe.  He did not lose consciousness.   Denies any dizziness right now  States that he has been fasting recently- this is his 3rd week. No food, but taking fluids.     Recommended going to orthopedic walk in clinic at [place] today for further assessment or emergency room. Notified patient that assessment advised and imaging may need to be obtained. Pt verbalized understanding and agreement to recommendations.    Reason for Disposition   MILD or MODERATE neck pain and fall from 3 feet (1 meter) or 5 stairs, or higher    Additional Information   Negative: Dangerous mechanism of injury (e.g., MVA, contact sports, diving, fall on trampoline, fall > 10 feet or 3 meters)  (Exception: Neck pain began > 1 hour after injury.)   Negative: Weakness of arms or legs   Negative: Numbness, tingling, or burning of arms, upper back/chest or legs  (Exception: Brief, now gone.)   Negative: Major bleeding (actively dripping or spurting) that can't be stopped   Negative: Bullet, knife or other serious penetrating wound   Negative: Difficulty breathing (e.g., choking or stridor)   Negative: Knocked out (unconscious) > 1 minute   Negative: Direct blow to front of neck and cough, hoarseness, abnormal voice, or can't talk   Negative: Sounds like a life-threatening emergency to the triager   Negative: Neck pain or stiffness from overuse (e.g., lifting, physical work)   Negative: Neck pain not from an injury   Negative: Dangerous mechanism of injury  (e.g., MVA, contact sports, diving, fall on trampoline, fall > 10 feet or 3 meters) and neck pain or stiffness began > 1 hour after injury   Negative: Numbness, tingling, or burning of arms, upper back/chest or legs and not present now (i.e., completely resolved)   Negative: Coughing up blood   Negative: Difficulty swallowing or drooling   Negative: Skin is split open or gaping  (length > 1/2 inch or 12 mm)   Negative: Puncture wound of neck   Negative: Bleeding won't stop after 10 minutes of direct pressure (using correct technique)   Negative: Large swelling or bruise (> 2 inches or 5 cm)   Negative: Sounds like a serious injury to the triager   Negative: SEVERE neck pain (e.g., excruciating)   Negative: Major injury from dangerous force (e.g., fall > 10 feet or 3 meters)   Negative: Major bleeding (e.g., actively dripping or spurting) and can't be stopped   Negative: Shock suspected (e.g., cold/pale/clammy skin, too weak to stand)   Negative: Difficult to awaken or acting confused (e.g., disoriented, slurred speech)   Negative: SEVERE weakness (e.g., unable to walk or barely able to walk, requires support) and new-onset or getting worse   Negative: Can't stand (bear weight) or walk and new-onset after fall   Negative: Sounds like a life-threatening emergency to the triager   Negative: Passed out (e.g., fainted, lost consciousness, blacked out and was not responding)   Negative: Weakness of the face, arm or leg on one side of the body AND new-onset or getting worse   Negative: Dizziness described as spinning or off balance (vertigo) AND new-onset or getting worse   Negative: Dizziness described as lightheadedness (no spinning sensation or trouble with balance) AND new-onset or getting worse   Negative: Pregnant and fall   Negative: Patient has a concerning injury to a specific part of the body (e.g., chest, leg, head)   Negative: Patient has a wound (e.g., cut, puncture, skin tear)    Protocols used: Falls and  [id number]-[id number], Neck Injury-A-OH  Rose Michael RN

## 2025-01-21 NOTE — PATIENT INSTRUCTIONS
# Neck Injury: Holden Ly  was seen today for neck injury. Symptoms had been going on since today 1/21/25. On examination there are positive findings of no midline cervical tenderness, right paraspinal muscle tenderness to palpation pain with cervical range of motion. Imaging findings showed mild cervical degeneration. Likely cause of patient's condition due to cervical arthritis flare in the setting of a fall. Counseled patient on nature of condition and treatment options.  Given this plan as below, follow-up 1/30/25 for repeat evaluation.     Image Findings: mild cervical arthritis  Treatment: Activities as tolerated, home exercises given today  Job: As tolerated  Medications/Injections: Relafen for pain, none today  Follow-up: 1/30/25 for repeat evaluation    Please call [phone]   Ask for my team if you have any questions or concerns    If you have not yet received the influenza vaccine but would like to get one, please call  [phone] or you can schedule via [facility]    It was great seeing you today!    Minor Bazan MD, [facility]

## 2025-01-21 NOTE — LETTER
1/21/2025      Holden Ly  [address] [place] [address]      Dear Colleague,    Thank you for referring your patient, Holden Ly, to the [facility]. Please see a copy of my visit note below.    ASSESSMENT & PLAN    Holden was seen today for injury.    Diagnoses and all orders for this visit:    Injury of neck, initial encounter  -     XR Cervical Spine 2/3 vws; Future  -     nabumetone (RELAFEN) 500 MG tablet; Take 1 tablet (500 mg) by mouth 2 times daily as needed for moderate pain.    Cervical arthritis  -     nabumetone (RELAFEN) 500 MG tablet; Take 1 tablet (500 mg) by mouth 2 times daily as needed for moderate pain.      This issue is acute and Worsening.     # Neck Injury: Holden Ly  was seen today for neck injury. Symptoms had been going on since today 1/21/25. On examination there are positive findings of no midline cervical tenderness, right paraspinal muscle tenderness to palpation pain with cervical range of motion. Imaging findings showed mild cervical degeneration. Likely cause of patient's condition due to cervical arthritis flare in the setting of a fall. Counseled patient on nature of condition and treatment options.  Given this plan as below, follow-up 1/30/25 for repeat evaluation.     Image Findings: mild cervical arthritis  Treatment: Activities as tolerated, home exercises given today  Job: As tolerated  Medications/Injections: Relafen for pain, none today  Follow-up: 1/30/25 for repeat evaluation    Minor Bazan MD  [facility]    -----  Chief Complaint   Patient presents with     Spine - Injury       SUBJECTIVE  Holden Ly is a/an 59 year old male who is seen as a walk-in for evaluation of c-spine.     The patient is seen by themselves.    Onset: This morning, 1/21/25. Patient describes injury as getting up after utilizing the restroom, getting dizzy and fell,  "fainting, landing on the edge of the tub on the side of his neck. No pain initially, went back to bed, woke up with an increase in pain.  Location of Pain: C-spine, right sided neck pain into upper shoulder  Worsened by: Twisting, turning, looking up and down  Better with:   Treatments tried: Various topicals  Associated symptoms: no distal numbness or tingling; denies swelling or warmth    Orthopedic/Surgical history: NO  Social History/Occupation:     No family history pertinent to patient's problem today.      REVIEW OF SYSTEMS:  Review of Systems  Constitutional, HEENT, cardiovascular, pulmonary, gi and gu systems are negative, except as otherwise noted.    OBJECTIVE:  Ht 1.753 m (5' 9\")   Wt 94.8 kg (209 lb)   BMI 30.86 kg/m     General: healthy, alert and in no distress  HEENT: no scleral icterus or conjunctival erythema  Skin: no suspicious lesions or rash. No jaundice.  CV: distal perfusion intact    Resp: normal respiratory effort without conversational dyspnea   Psych: normal mood and affect  Gait: normal steady gait with appropriate coordination and balance    Neuro: Normal light sensory exam of bilateral upper extremity      Ortho Exam   CERVICAL SPINE  Inspection:    normal cervical lordosis present, rounded shoulders, forward head posture  Palpation:    TTP over right paraspinal muscles.  Range of Motion:     Flexion limited 2/2 pain    Extension limited 2/2 pain    Right side bend limited 2/2 pain    Left side bend limited 2/2 pain    Right rotation limited 2/2 pain    Left rotation limited 2/2 pain  Strength:    Full strength throughout all neck muscles  Special Tests:    Positive: None    Negative: Spurling's (bilateral)    RADIOLOGY:  I independently ordered, visualized and reviewed these images with the patient  No fracture, mild cervical degenerative changes      Review of external notes as documented elsewhere in note  Review of the result(s) of each unique test - cervical x-rays   "     Disclaimer: This note consists of symbols derived from keyboarding, dictation and/or voice recognition software. As a result, there may be errors in the script that have gone undetected. Please consider this when interpreting information found in this chart.       Again, thank you for allowing me to participate in the care of your patient.        Sincerely,        Minor Bazan MD    Electronically signed

## 2025-03-16 ENCOUNTER — NURSE TRIAGE (OUTPATIENT)
Dept: NURSING | Facility: CLINIC | Age: 60
End: 2025-03-16
Payer: COMMERCIAL

## 2025-03-16 NOTE — TELEPHONE ENCOUNTER
"Nurse Triage SBAR    Is this a 2nd Level Triage? NO    Situation: Chest pain     Background: Pt reports \"early this week severe chest pain, persisted for two days, tremors in hand, happening for some time now\". Pt reports \"pain has subsided\". Pt states he has no acute symptoms at time of call.     Assessment:  Chest pain, tremors, cause unknown     Protocol Recommended Disposition:   Call  Now    Recommendation:  Advised pt to dial 911 now per protocol or if choosing not to dial 911 have another adult drive him to ED now.     Pt verbalizes understanding of advice and states he will drive himself.     Writer advised pt not safe for pt to drive without evaluation first.     Pt verbalizes understanding and agrees to plan.     Does the patient meet one of the following criteria for ADS visit consideration? 16+ years old, with an [facility] PCP     TIP  Providers, please consider if this condition is appropriate for management at one of our Acute and Diagnostic Services sites.     If patient is a good candidate, please use dotphrase <dot>triageresponse and select Refer to ADS to document.    Reason for Disposition   Chest pain lasting longer than 5 minutes and ANY of the following:    history of heart disease  (i.e., heart attack, bypass surgery, angina, angioplasty, CHF; not just a heart murmur)    described as crushing, pressure-like, or heavy    age > 44    age > 30 AND at least one cardiac risk factor (i.e., hypertension, diabetes, obesity, smoker or strong family history of heart disease)    not relieved with nitroglycerin    Additional Information   Negative: SEVERE difficulty breathing (e.g., struggling for each breath, speaks in single words)   Negative: Difficult to awaken or acting confused (e.g., disoriented, slurred speech)   Negative: Shock suspected (e.g., cold/pale/clammy skin, too weak to stand, low BP, rapid pulse)   Negative: Passed out (i.e., lost consciousness, collapsed and was not " responding)    Protocols used: Chest Pain-A-AH

## 2025-03-31 ENCOUNTER — NURSE TRIAGE (OUTPATIENT)
Dept: FAMILY MEDICINE | Facility: CLINIC | Age: 60
End: 2025-03-31

## 2025-03-31 NOTE — TELEPHONE ENCOUNTER
"Patient is scheduled for a office visit on 4/3/25 at 10:10 AM for \"Intermittent chest pain lasting about 3 to 5 minutes and persistent and severe hand tremors.\" Patient was triaged on 3/16/25 for this same issue, and was recommend that he go to the call EMS immediately or go to ED immediately. No record of patient going into ED. Please advise.     Anabel Boateng CMA      "

## 2025-03-31 NOTE — TELEPHONE ENCOUNTER
"S-(situation): Patient with mild chest pain episode which lasted less than 1 minute yesterday (mid-day)  No more chest pain for over 24 hours.     B-(background): Patient had experienced more severe chest pains on 3/15/25, however states they stopped & he didn't go to the ED for evaluation. He had no more chest pain for the past 2 weeks, until the episode yesterday.     A-(assessment): Patient needing evaluation for Off and On chest Pain.     R-(recommendations): Patient instructed to call \"911\" or go to the ED if his chest pain worsens this evening. He verbalized a good understanding.     Scheduled to See PCP tomorrow morning at 8 AM.   Patient declined need to go to the ED now & prefers to see PCP instead.     Elisa Lim RN BSN  [facility]                   Reason for Disposition   Pain also in shoulder(s) or arm(s) or jaw    Additional Information   Negative: SEVERE difficulty breathing (e.g., struggling for each breath, speaks in single words)   Negative: Difficult to awaken or acting confused (e.g., disoriented, slurred speech)   Negative: Shock suspected (e.g., cold/pale/clammy skin, too weak to stand, low BP, rapid pulse)   Negative: Passed out (i.e., lost consciousness, collapsed and was not responding)   Negative: Chest pain lasting longer than 5 minutes and ANY of the following:         Pain is crushing, pressure-like, or heavy         Over 44 years old          Over 30 years old and one cardiac risk factor (e.g diabetes, high blood pressure, high cholesterol, smoker, or family history of heart disease)         History of heart disease (e.g. angina, heart attack, heart failure, bypass surgery, takes nitroglycerin)   Negative: Heart beating < 50 beats per minute OR > 140 beats per minute   Negative: Visible sweat on face or sweat dripping down face   Negative: Sounds like a life-threatening emergency to the triager   Negative: Followed an injury to chest   Negative: SEVERE chest pain   Negative: " Difficulty breathing   Negative: Cocaine use within last 3 days   Negative: Major surgery in the past month   Negative: Hip or leg fracture (broken bone) in past month (or had cast on leg or ankle in past month)   Negative: Illness requiring prolonged bedrest in past month (e.g., immobilization, long hospital stay)   Negative: Long-distance travel in past month (e.g., car, bus, train, plane; with trip lasting 6 or more hours)   Negative: History of prior 'blood clot' in leg or lungs (i.e., deep vein thrombosis, pulmonary embolism)   Negative: History of inherited increased risk of blood clots (e.g., Factor 5 Leiden, Anti-thrombin 3, Protein C or Protein S deficiency, Prothrombin mutation)   Negative: Cancer treatment in the past two months (or has cancer now)   Negative: Heart beating irregularly or very rapidly   Negative: Chest pain lasting longer than 5 minutes and occurred in last 3 days (72 hours) (Exception: Feels exactly the same as previously diagnosed heartburn and has accompanying sour taste in mouth.)   Negative: Chest pain or 'angina' comes and goes and is happening more often (increasing in frequency) or getting worse (increasing in severity) (Exception: Chest pains that last only a few seconds.)   Negative: Dizziness or lightheadedness   Negative: Coughing up blood   Negative: Patient sounds very sick or weak to the triager   Negative: Patient says chest pain feels exactly the same as previously diagnosed 'heartburn' and describes burning in chest and accompanying sour taste in mouth   Negative: Fever > 100.4 F (38.0 C)   Negative: Chest pain(s) lasting a few seconds persists > 3 days   Negative: Rash in same area as pain (may be described as 'small blisters')   Negative: All other patients with chest pain (Exception: Fleeting chest pain lasting a few seconds.)   Negative: Patient wants to be seen   Negative: Chest pain(s) lasting a few seconds from coughing   Negative: Chest pain(s) lasting a few  "seconds    Answer Assessment - Initial Assessment Questions  1. LOCATION: \"Where does it hurt?\"        Upper Left Chest    2. RADIATION: \"Does the pain go anywhere else?\" (e.g., into neck, jaw, arms, back)      Radiates to the middle of his chest/left shoulder    3. ONSET: \"When did the chest pain begin?\" (Minutes, hours or days)       Yesterday Mid-day    4. PATTERN: \"Does the pain come and go, or has it been constant since it started?\"  \"Does it get worse with exertion?\"       Now lasting less than 1 minute. Starts as a shooting pain then slowly lessens.     5. DURATION: \"How long does it last\" (e.g., seconds, minutes, hours)      Less than a minute    6. SEVERITY: \"How bad is the pain?\"  (e.g., Scale 1-10; mild, moderate, or severe)     - MILD (1-3): doesn't interfere with normal activities      - MODERATE (4-7): interferes with normal activities or awakens from sleep     - SEVERE (8-10): excruciating pain, unable to do any normal activities        Mild episode yesterday (just once)    7. CARDIAC RISK FACTORS: \"Do you have any history of heart problems or risk factors for heart disease?\" (e.g., angina, prior heart attack; diabetes, high blood pressure, high cholesterol, smoker, or strong family history of heart disease)      Pre-Diabetic, High Cholesterol     8. PULMONARY RISK FACTORS: \"Do you have any history of lung disease?\"  (e.g., blood clots in lung, asthma, emphysema, birth control pills)      No    9. CAUSE: \"What do you think is causing the chest pain?\"      Not sure    10. OTHER SYMPTOMS: \"Do you have any other symptoms?\" (e.g., dizziness, nausea, vomiting, sweating, fever, difficulty breathing, cough)        None    11. PREGNANCY: \"Is there any chance you are pregnant?\" \"When was your last menstrual period?\"        NA    Protocols used: Chest Pain-A-[place]    "

## 2025-04-01 ENCOUNTER — OFFICE VISIT (OUTPATIENT)
Dept: FAMILY MEDICINE | Facility: CLINIC | Age: 60
End: 2025-04-01
Payer: COMMERCIAL

## 2025-04-01 VITALS
OXYGEN SATURATION: 98 % | BODY MASS INDEX: 32.85 KG/M2 | WEIGHT: 221.8 LBS | HEIGHT: 69 IN | SYSTOLIC BLOOD PRESSURE: 108 MMHG | TEMPERATURE: 97 F | DIASTOLIC BLOOD PRESSURE: 86 MMHG | RESPIRATION RATE: 16 BRPM | HEART RATE: 69 BPM

## 2025-04-01 DIAGNOSIS — R07.9 CHEST PAIN, UNSPECIFIED TYPE: Primary | ICD-10-CM

## 2025-04-01 DIAGNOSIS — R73.03 PREDIABETES: ICD-10-CM

## 2025-04-01 DIAGNOSIS — Z13.1 SCREENING FOR DIABETES MELLITUS: ICD-10-CM

## 2025-04-01 DIAGNOSIS — E78.00 HIGH CHOLESTEROL: ICD-10-CM

## 2025-04-01 LAB
ANION GAP SERPL CALCULATED.3IONS-SCNC: 9 MMOL/L (ref 7–15)
BASOPHILS # BLD AUTO: 0 10E3/UL (ref 0–0.2)
BASOPHILS NFR BLD AUTO: 0 %
BUN SERPL-MCNC: 17.2 MG/DL (ref 8–23)
CALCIUM SERPL-MCNC: 9.4 MG/DL (ref 8.8–10.4)
CHLORIDE SERPL-SCNC: 105 MMOL/L (ref 98–107)
CREAT SERPL-MCNC: 1.56 MG/DL (ref 0.67–1.17)
EGFRCR SERPLBLD CKD-EPI 2021: 50 ML/MIN/1.73M2
EOSINOPHIL # BLD AUTO: 0.1 10E3/UL (ref 0–0.7)
EOSINOPHIL NFR BLD AUTO: 2 %
ERYTHROCYTE [DISTWIDTH] IN BLOOD BY AUTOMATED COUNT: 14.1 % (ref 10–15)
EST. AVERAGE GLUCOSE BLD GHB EST-MCNC: 131 MG/DL
GLUCOSE SERPL-MCNC: 110 MG/DL (ref 70–99)
HBA1C MFR BLD: 6.2 % (ref 0–5.6)
HCO3 SERPL-SCNC: 23 MMOL/L (ref 22–29)
HCT VFR BLD AUTO: 50.1 % (ref 40–53)
HGB BLD-MCNC: 16.5 G/DL (ref 13.3–17.7)
IMM GRANULOCYTES # BLD: 0 10E3/UL
IMM GRANULOCYTES NFR BLD: 0 %
LYMPHOCYTES # BLD AUTO: 3.3 10E3/UL (ref 0.8–5.3)
LYMPHOCYTES NFR BLD AUTO: 52 %
MCH RBC QN AUTO: 30.4 PG (ref 26.5–33)
MCHC RBC AUTO-ENTMCNC: 32.9 G/DL (ref 31.5–36.5)
MCV RBC AUTO: 92 FL (ref 78–100)
MONOCYTES # BLD AUTO: 0.6 10E3/UL (ref 0–1.3)
MONOCYTES NFR BLD AUTO: 9 %
NEUTROPHILS # BLD AUTO: 2.4 10E3/UL (ref 1.6–8.3)
NEUTROPHILS NFR BLD AUTO: 37 %
PLATELET # BLD AUTO: 179 10E3/UL (ref 150–450)
POTASSIUM SERPL-SCNC: 4.4 MMOL/L (ref 3.4–5.3)
RBC # BLD AUTO: 5.43 10E6/UL (ref 4.4–5.9)
SODIUM SERPL-SCNC: 137 MMOL/L (ref 135–145)
TSH SERPL DL<=0.005 MIU/L-ACNC: 1.69 UIU/ML (ref 0.3–4.2)
WBC # BLD AUTO: 6.4 10E3/UL (ref 4–11)

## 2025-04-01 PROCEDURE — 83036 HEMOGLOBIN GLYCOSYLATED A1C: CPT | Performed by: PHYSICIAN ASSISTANT

## 2025-04-01 PROCEDURE — 93000 ELECTROCARDIOGRAM COMPLETE: CPT | Performed by: PHYSICIAN ASSISTANT

## 2025-04-01 PROCEDURE — 36415 COLL VENOUS BLD VENIPUNCTURE: CPT | Performed by: PHYSICIAN ASSISTANT

## 2025-04-01 PROCEDURE — G2211 COMPLEX E/M VISIT ADD ON: HCPCS | Performed by: PHYSICIAN ASSISTANT

## 2025-04-01 PROCEDURE — 84443 ASSAY THYROID STIM HORMONE: CPT | Performed by: PHYSICIAN ASSISTANT

## 2025-04-01 PROCEDURE — 85025 COMPLETE CBC W/AUTO DIFF WBC: CPT | Performed by: PHYSICIAN ASSISTANT

## 2025-04-01 PROCEDURE — 99214 OFFICE O/P EST MOD 30 MIN: CPT | Performed by: PHYSICIAN ASSISTANT

## 2025-04-01 PROCEDURE — 80048 BASIC METABOLIC PNL TOTAL CA: CPT | Performed by: PHYSICIAN ASSISTANT

## 2025-04-01 ASSESSMENT — PAIN SCALES - GENERAL: PAINLEVEL_OUTOF10: NO PAIN (0)

## 2025-04-01 NOTE — PROGRESS NOTES
Assessment & Plan     Chest pain, unspecified type  If symptoms return/worsen discussed he needs to go to ER immediately  Schedule stress test  I will follow up with labs   Need to look for cardiac cause , m/s, referred pain also in differential  Ekg did not show specific findings, also not having symptoms at time of ekg    - EKG 12-lead complete w/read - Clinics  - CBC with platelets and differential; Future  - Basic metabolic panel  (Ca, Cl, CO2, Creat, Gluc, K, Na, BUN); Future  - TSH with free T4 reflex; Future  - Echocardiogram Exercise Stress; Future    Screening for diabetes mellitus    - Hemoglobin A1c; Future    High cholesterol      Prediabetes        The longitudinal plan of care for the diagnosis(es)/condition(s) as documented were addressed during this visit. Due to the added complexity in care, I will continue to support Holden in the subsequent management and with ongoing continuity of care.      Subjective   Holden is a 59 year old, presenting for the following health issues:  Chest Pain        4/1/2025     7:54 AM   Additional Questions   Roomed by Anabel MATTHEW CMA   Accompanied by n/a         4/1/2025     7:54 AM   Patient Reported Additional Medications   Patient reports taking the following new medications No Medications Missing     History of Present Illness       Reason for visit:  Chest pain and shocks in left hand  Symptom onset:  1-2 weeks ago  Symptoms include:  Moving pain across left chest and upper shoulder electric shocks in left hand when touching surfaces  Symptom intensity:  Severe  Symptom progression:  Improving  Had these symptoms before:  Yes  Has tried/received treatment for these symptoms:  No  What makes it worse:  Lifting heavy things  What makes it better:  Drink water ,breathing   He is taking medications regularly.      Pt states it happened once mid month last month, and then this most recent Sunday. Pt states that chest pain starts in left  of chest and radiates up  "to the left shoulder.     Chest Pain  Onset/Duration: 1 month. Mid month happened 3 consecutive days (these were severe)  Description:   Location: left side  Character: burning and Pressing  Radiation: from left side over shoulder  Duration: 2-3 minutes, as low as 30 sec (the most recent 30 sec)  Intensity: severe  Progression of Symptoms: improving  Accompanying Signs & Symptoms:  Shortness of breath: No  Sweating: No  Nausea/vomiting: No  Lightheadedness: No  Palpitations: No  Fever/Chills: No  Cough: No           Heartburn: No  History:   Family history of heart disease: No  Tobacco use: No  Previous similar symptoms: YES  Precipitating factors:   Worse with exertion: YES- lifting heavy things specifically with left hand  Worse with deep breaths: No (if anything this helps)           Related to eating: No           Better with burping: No  Alleviating factors:   Therapies tried and outcome: drinking water, deep breaths    Chest pain unsure the trigger. Worse if lifting with left hand not immediately but later on he feels it there. No sweating or jaw pain or SOB. Not tender to touch. No pain today. Doesn't usually exercise. Has not noticed pain worse with exertion. Last month this happened 3 consecutive days then again this sunday.   No cough or leg edema. No injury. Has pre-diabetes and high cholesterol and obesity risk factors.         Objective    /86   Pulse 69   Temp 97  F (36.1  C) (Temporal)   Resp 16   Ht 1.75 m (5' 8.9\")   Wt 100.6 kg (221 lb 12.8 oz)   SpO2 98%   BMI 32.85 kg/m    Body mass index is 32.85 kg/m .  Physical Exam   GENERAL: alert and no distress  NECK: no adenopathy, no asymmetry, masses, or scars  RESP: lungs clear to auscultation - no rales, rhonchi or wheezes  CV: regular rate and rhythm, normal S1 S2, no S3 or S4, no murmur, click or rub, no peripheral edema  MS: no gross musculoskeletal defects noted, no edema  NEURO: Normal strength and tone, mentation intact and speech " normal  PSYCH: mentation appears normal, affect normal/bright            Signed Electronically by: Shirley Shabazz PA-C

## 2025-04-03 DIAGNOSIS — R79.89 ELEVATED SERUM CREATININE: Primary | ICD-10-CM

## 2025-04-03 NOTE — RESULT ENCOUNTER NOTE
Andrew Hatch,       Your recent test results are attached, if you have any questions or concerns please feel free to contact me via e-mail or call [phone].    Your kidney function was decreased on this lab from 5 months ago, if you had not been well hydrated prior to testing this could be a factor. I would like you get labs (Non-fasting) in about 2 weeks to recheck your kidney function, future labs placed.     A1C was 6.2 which is consistent with the diagnosis of pre-diabetes. This means your blood sugars have been above the normal range but not yet in the diabetic range. This means you are at increased risk of developing diabetes over time.  Lifestyle changes can help with this. Increasing your activity, even walking more every day can help.  Decreasing your sugar and carbohydrate intake can also help.  For example consuming less white bread, pasta, chips/pretzels, soda, juice and eating more vegetables and lean protein. When you do have carbohydrates choosing 100 percent whole wheat is better than white bread for example.  I would recommend to recheck your A1C in 6 months. Thyroid normal. White and red blood cell counts normal no anemia.          It was a pleasure to see you at your recent office visit.      Sincerely,  Shirley Shabazz PA-C

## 2025-04-22 ENCOUNTER — OFFICE VISIT (OUTPATIENT)
Dept: FAMILY MEDICINE | Facility: CLINIC | Age: 60
End: 2025-04-22
Payer: COMMERCIAL

## 2025-04-22 VITALS
HEIGHT: 69 IN | OXYGEN SATURATION: 99 % | BODY MASS INDEX: 32.79 KG/M2 | HEART RATE: 68 BPM | TEMPERATURE: 96.5 F | WEIGHT: 221.4 LBS | DIASTOLIC BLOOD PRESSURE: 86 MMHG | RESPIRATION RATE: 16 BRPM | SYSTOLIC BLOOD PRESSURE: 126 MMHG

## 2025-04-22 DIAGNOSIS — J06.9 VIRAL URI: ICD-10-CM

## 2025-04-22 DIAGNOSIS — R79.89 ELEVATED SERUM CREATININE: Primary | ICD-10-CM

## 2025-04-22 PROCEDURE — 36415 COLL VENOUS BLD VENIPUNCTURE: CPT | Performed by: PHYSICIAN ASSISTANT

## 2025-04-22 PROCEDURE — 99213 OFFICE O/P EST LOW 20 MIN: CPT | Performed by: PHYSICIAN ASSISTANT

## 2025-04-22 PROCEDURE — 80048 BASIC METABOLIC PNL TOTAL CA: CPT | Performed by: PHYSICIAN ASSISTANT

## 2025-04-22 PROCEDURE — G2211 COMPLEX E/M VISIT ADD ON: HCPCS | Performed by: PHYSICIAN ASSISTANT

## 2025-04-22 ASSESSMENT — PAIN SCALES - GENERAL: PAINLEVEL_OUTOF10: NO PAIN (0)

## 2025-04-22 NOTE — PROGRESS NOTES
Assessment & Plan     Elevated serum creatinine  I will follow up with labs     - **Basic metabolic panel FUTURE 14d    Viral URI  See hpi, f/u if symptoms worsen or change or do not improve after a week    1)  Drink plenty of fluids and rest often.     2)  Wash hands to avoid spread of the infection.    3)  Take over-the-counter pain medication such as Tylenol every 6 hours as needed for pain or fever.    4)  Return to or contact clinic if symptoms do not resolve in 7-10 days.  Go to urgent care if symptoms worsen.        The longitudinal plan of care for the diagnosis(es)/condition(s) as documented were addressed during this visit. Due to the added complexity in care, I will continue to support Holden in the subsequent management and with ongoing continuity of care.      Subjective   Holden is a 59 year old, presenting for the following health issues:  Pre-diabetes f/u and elevated creatinine:        4/1/2025     7:54 AM   Additional Questions   Roomed by Anabel MATTHEW CMA   Accompanied by n/a     Kidney Problem    History of Present Illness       Diabetes:   He presents for follow up of diabetes.    He is not checking blood glucose.        He is concerned about other.    He is not experiencing numbness or burning in feet, excessive thirst, blurry vision, weight changes or redness, sores or blisters on feet.           He eats 0-1 servings of fruits and vegetables daily.He consumes 1 sweetened beverage(s) daily.He exercises with enough effort to increase his heart rate 20 to 29 minutes per day.  He exercises with enough effort to increase his heart rate 3 or less days per week.   He is taking medications regularly.      Patient is here to recheck his creatinine. It had worsened with our last check. He is on BB for tremor, no known history of HTN or FH of kidney disease. No diabetes but has pre-diabetes. Stopped nabumetone. Does not typically drink much water drinks tea.     - has been having cold symptoms the  "last couple of days. Did not take home covid test, no fever, declines need for testing today will manage at home. Vitals are normal.       Acute Illness  Acute illness concerns:   Onset/Duration: 3 days  Symptoms:  Fever: No  Chills/Sweats: No  Headache (location?): YES- first day, but not anymore  Sinus Pressure: No  Conjunctivitis:  No  Ear Pain: no  Rhinorrhea: YES  Congestion: YES  Sore Throat: YES  Cough: YES-productive of brown sputum  Wheeze: No  Decreased Appetite: No  Nausea: No  Vomiting: No  Diarrhea: No  Dysuria/Freq.: No  Dysuria or Hematuria: No  Fatigue/Achiness: YES  Sick/Strep Exposure: No  Therapies tried and outcome: nyquil    Oxygen is 99 percent.       Objective    /86   Pulse 68   Temp (!) 96.5  F (35.8  C) (Temporal)   Resp 16   Ht 1.753 m (5' 9.02\")   Wt 100.4 kg (221 lb 6.4 oz)   SpO2 99%   BMI 32.68 kg/m    There is no height or weight on file to calculate BMI.  Physical Exam   GENERAL: alert and no distress  EYES: Eyes grossly normal to inspection, PERRL and conjunctivae and sclerae normal  HENT: ear canals and TM's normal, nose and mouth without ulcers or lesions  NECK: no adenopathy, no asymmetry, masses, or scars  RESP: lungs clear to auscultation - no rales, rhonchi or wheezes  CV: regular rate and rhythm, normal S1 S2, no S3 or S4, no murmur, click or rub, no peripheral edema  MS: no gross musculoskeletal defects noted, no edema  PSYCH: mentation appears normal, affect normal/bright            Signed Electronically by: Shirley Shabazz PA-C    "

## 2025-04-23 LAB
ANION GAP SERPL CALCULATED.3IONS-SCNC: 8 MMOL/L (ref 7–15)
BUN SERPL-MCNC: 13.3 MG/DL (ref 8–23)
CALCIUM SERPL-MCNC: 9.2 MG/DL (ref 8.8–10.4)
CHLORIDE SERPL-SCNC: 107 MMOL/L (ref 98–107)
CREAT SERPL-MCNC: 1.5 MG/DL (ref 0.67–1.17)
EGFRCR SERPLBLD CKD-EPI 2021: 53 ML/MIN/1.73M2
GLUCOSE SERPL-MCNC: 113 MG/DL (ref 70–99)
HCO3 SERPL-SCNC: 25 MMOL/L (ref 22–29)
POTASSIUM SERPL-SCNC: 4.2 MMOL/L (ref 3.4–5.3)
SODIUM SERPL-SCNC: 140 MMOL/L (ref 135–145)

## 2025-04-24 DIAGNOSIS — N18.31 STAGE 3A CHRONIC KIDNEY DISEASE (H): Primary | ICD-10-CM

## 2025-04-24 NOTE — RESULT ENCOUNTER NOTE
PLEASE CALL PATIENT: Dear Holden,      It was a pleasure to see you at your recent office visit.  Your test results are listed below.  Kidney function slightly better than last check but still meeting criteria for chronic kidney disease. I have placed referral to nephrology for consult. I also placed repeat lab to be done in 2 months to monitor.         If you have any questions or concerns, please call the clinic at [phone].    Sincerely,  Shirley Shabazz PA-C

## 2025-04-28 ENCOUNTER — PATIENT OUTREACH (OUTPATIENT)
Dept: CARE COORDINATION | Facility: CLINIC | Age: 60
End: 2025-04-28
Payer: COMMERCIAL

## 2025-06-23 ENCOUNTER — LAB (OUTPATIENT)
Dept: LAB | Facility: CLINIC | Age: 60
End: 2025-06-23
Payer: COMMERCIAL

## 2025-06-23 ENCOUNTER — RESULTS FOLLOW-UP (OUTPATIENT)
Dept: FAMILY MEDICINE | Facility: CLINIC | Age: 60
End: 2025-06-23

## 2025-06-23 DIAGNOSIS — H53.9 VISION CHANGES: ICD-10-CM

## 2025-06-23 DIAGNOSIS — E78.00 HIGH CHOLESTEROL: ICD-10-CM

## 2025-06-23 DIAGNOSIS — N18.31 STAGE 3A CHRONIC KIDNEY DISEASE (H): ICD-10-CM

## 2025-06-23 LAB
ANION GAP SERPL CALCULATED.3IONS-SCNC: 11 MMOL/L (ref 7–15)
BUN SERPL-MCNC: 14.3 MG/DL (ref 8–23)
CALCIUM SERPL-MCNC: 9.3 MG/DL (ref 8.8–10.4)
CHLORIDE SERPL-SCNC: 108 MMOL/L (ref 98–107)
CHOLEST SERPL-MCNC: 108 MG/DL
CREAT SERPL-MCNC: 1.51 MG/DL (ref 0.67–1.17)
EGFRCR SERPLBLD CKD-EPI 2021: 52 ML/MIN/1.73M2
FASTING STATUS PATIENT QL REPORTED: YES
FASTING STATUS PATIENT QL REPORTED: YES
GLUCOSE SERPL-MCNC: 105 MG/DL (ref 70–99)
HCO3 SERPL-SCNC: 20 MMOL/L (ref 22–29)
HDLC SERPL-MCNC: 31 MG/DL
LDLC SERPL CALC-MCNC: 67 MG/DL
NONHDLC SERPL-MCNC: 77 MG/DL
POTASSIUM SERPL-SCNC: 4 MMOL/L (ref 3.4–5.3)
SODIUM SERPL-SCNC: 139 MMOL/L (ref 135–145)
TRIGL SERPL-MCNC: 48 MG/DL

## 2025-06-23 PROCEDURE — 80048 BASIC METABOLIC PNL TOTAL CA: CPT

## 2025-06-23 PROCEDURE — 80061 LIPID PANEL: CPT

## 2025-06-23 PROCEDURE — 36415 COLL VENOUS BLD VENIPUNCTURE: CPT
